# Patient Record
Sex: FEMALE | Race: WHITE | Employment: OTHER | URBAN - METROPOLITAN AREA
[De-identification: names, ages, dates, MRNs, and addresses within clinical notes are randomized per-mention and may not be internally consistent; named-entity substitution may affect disease eponyms.]

---

## 2017-06-29 ENCOUNTER — HOSPITAL ENCOUNTER (OUTPATIENT)
Dept: CT IMAGING | Age: 72
Discharge: HOME | End: 2017-06-29
Attending: INTERNAL MEDICINE
Payer: SELF-PAY

## 2017-06-29 DIAGNOSIS — Z00.00 PREVENTATIVE HEALTH CARE: ICD-10-CM

## 2017-06-29 PROCEDURE — 75571 CT HRT W/O DYE W/CA TEST: CPT

## 2017-06-30 ENCOUNTER — TELEPHONE (OUTPATIENT)
Dept: CARDIOLOGY CLINIC | Age: 72
End: 2017-06-30

## 2017-06-30 NOTE — TELEPHONE ENCOUNTER
----- Message from Eden Hyatt MD sent at 6/30/2017  7:58 AM EDT -----  Inform her she has mild plaque in CAD based upon cardiac CT. Have her f/u with me in office if she has questions. Called pt,verified pt with two pt identifiers, told pt that her CT scan is showing mild plaque with CAD and she can f/u in office to discuss with . Gave pt our office number and gave her  's name. Advised to call back and schedule an appt to discuss test results further. She verbalized that she understood everything.

## 2021-08-08 ENCOUNTER — APPOINTMENT (OUTPATIENT)
Dept: GENERAL RADIOLOGY | Age: 76
End: 2021-08-08
Attending: EMERGENCY MEDICINE
Payer: MEDICARE

## 2021-08-08 ENCOUNTER — HOSPITAL ENCOUNTER (EMERGENCY)
Dept: GENERAL RADIOLOGY | Age: 76
Discharge: HOME | End: 2021-08-08
Attending: EMERGENCY MEDICINE
Payer: MEDICARE

## 2021-08-08 ENCOUNTER — HOSPITAL ENCOUNTER (EMERGENCY)
Age: 76
Discharge: HOME | End: 2021-08-08
Attending: EMERGENCY MEDICINE
Payer: MEDICARE

## 2021-08-08 VITALS
HEIGHT: 67 IN | SYSTOLIC BLOOD PRESSURE: 153 MMHG | OXYGEN SATURATION: 88 % | BODY MASS INDEX: 25.11 KG/M2 | WEIGHT: 160 LBS | DIASTOLIC BLOOD PRESSURE: 68 MMHG | RESPIRATION RATE: 16 BRPM | HEART RATE: 74 BPM | TEMPERATURE: 96.4 F

## 2021-08-08 DIAGNOSIS — S82.401A FRACTURE OF TIBIA AND FIBULA, RIGHT, CLOSED, INITIAL ENCOUNTER: ICD-10-CM

## 2021-08-08 DIAGNOSIS — S52.91XA RADIUS/ULNA FRACTURE, RIGHT, CLOSED, INITIAL ENCOUNTER: ICD-10-CM

## 2021-08-08 DIAGNOSIS — S52.201A RADIUS/ULNA FRACTURE, RIGHT, CLOSED, INITIAL ENCOUNTER: ICD-10-CM

## 2021-08-08 DIAGNOSIS — S82.201A FRACTURE OF TIBIA AND FIBULA, RIGHT, CLOSED, INITIAL ENCOUNTER: ICD-10-CM
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
## 2021-08-08 PROCEDURE — 74011250637 HC RX REV CODE- 250/637: Performed by: EMERGENCY MEDICINE

## 2021-08-08 PROCEDURE — 74011000250 HC RX REV CODE- 250: Performed by: EMERGENCY MEDICINE

## 2021-08-08 PROCEDURE — 73100 X-RAY EXAM OF WRIST: CPT

## 2021-08-08 PROCEDURE — 99284 EMERGENCY DEPT VISIT MOD MDM: CPT

## 2021-08-08 PROCEDURE — 73110 X-RAY EXAM OF WRIST: CPT

## 2021-08-08 RX ORDER — LEVOTHYROXINE SODIUM 88 UG/1
88 TABLET ORAL
Status: CANCELLED | OUTPATIENT
Start: 2021-08-09

## 2021-08-08 RX ORDER — LIOTHYRONINE SODIUM 5 UG/1
15 TABLET ORAL DAILY
COMMUNITY

## 2021-08-08 RX ORDER — LIDOCAINE HYDROCHLORIDE 10 MG/ML
5 INJECTION, SOLUTION EPIDURAL; INFILTRATION; INTRACAUDAL; PERINEURAL ONCE
Status: COMPLETED | OUTPATIENT
Start: 2021-08-08 | End: 2021-08-08

## 2021-08-08 RX ORDER — LIOTHYRONINE SODIUM 5 UG/1
15 TABLET ORAL DAILY
Status: CANCELLED | OUTPATIENT
Start: 2021-08-09

## 2021-08-08 RX ORDER — OXYCODONE AND ACETAMINOPHEN 5; 325 MG/1; MG/1
1 TABLET ORAL
Status: COMPLETED | OUTPATIENT
Start: 2021-08-08 | End: 2021-08-08

## 2021-08-08 RX ORDER — LEVOTHYROXINE SODIUM 88 UG/1
88 TABLET ORAL
COMMUNITY

## 2021-08-08 RX ORDER — TRAMADOL HYDROCHLORIDE 50 MG/1
50 TABLET ORAL
Qty: 15 TABLET | Refills: 0 | Status: SHIPPED | OUTPATIENT
Start: 2021-08-08 | End: 2021-08-13

## 2021-08-08 RX ORDER — BUPIVACAINE HYDROCHLORIDE 5 MG/ML
5 INJECTION, SOLUTION EPIDURAL; INTRACAUDAL ONCE
Status: COMPLETED | OUTPATIENT
Start: 2021-08-08 | End: 2021-08-08

## 2021-08-08 RX ADMIN — LIDOCAINE HYDROCHLORIDE 5 ML: 10 INJECTION, SOLUTION EPIDURAL; INFILTRATION; INTRACAUDAL; PERINEURAL at 12:03

## 2021-08-08 RX ADMIN — OXYCODONE HYDROCHLORIDE AND ACETAMINOPHEN 1 TABLET: 5; 325 TABLET ORAL at 11:01

## 2021-08-08 RX ADMIN — BUPIVACAINE HYDROCHLORIDE 25 MG: 5 INJECTION, SOLUTION EPIDURAL; INTRACAUDAL; PERINEURAL at 12:03

## 2021-08-08 NOTE — DISCHARGE INSTRUCTIONS
Thank you for allowing us to provide you with medical care today. We realize that you have many choices for your emergency care needs. We thank you for choosing [facility]. Please choose us in the future for any continued health care needs. We hope we addressed all of your medical concerns. We strive to provide excellent quality care in the Emergency Department. Anything less than excellent does not meet our expectations. The exam and treatment you received in the Emergency Department were for an emergent problem and are not intended as complete care. It is important that you follow up with a doctor, nurse practitioner, or [address] [place] [address] assistant for ongoing care. If your symptoms worsen or you do not improve as expected and you are unable to reach your usual health care provider, you should return to the Emergency Department. We are available 24 hours a day. Take this sheet with you when you go to your follow-up visit. If you have any problem arranging the follow-up visit, contact the Emergency Department immediately. Make an appointment your family doctor for follow up of this visit. Return to the ER if you are unable to be seen in a timely manner.

## 2021-08-08 NOTE — ED TRIAGE NOTES
Pt arrives via EMS they report GLF with right wrist injury. Pt states that she was on the patio around the pool swishing water into the filter when she hit an uneven area on the patio with her foot causing her to fall. She reached out with her right arm to break the fall and injured the right wrist.  She has swelling and obvious deformity to the wrist.  strong radial pulse with good sensation to the fingers.   Dr Kyle Morales at the bedside

## 2021-08-08 NOTE — ED PROVIDER NOTES
Please note that this dictation was completed with [employer], the computer voice recognition software.  Quite often unanticipated grammatical, syntax, homophones, and other interpretive errors are inadvertently transcribed by the computer software.  Please disregard these errors.  Please excuse any errors that have escaped final proofreading. 77-year-old female 'L handed writing/ R handedeverything else' and past medical history markable for hypertension hypothyroidism presents the ER by EMS status post ground-level fall complaining of \"R  wrist pain deformity after I slipped and fell ([address]) this am.\"  Patient states did not hit her head did not lose consciousness is not on any blood thinners. Felt fine otherwise, just can clean up the pool, just cannot believe I did this to myself. \"    See's Dr David Meyer    pt denies HA, vison changes, diff swallowing, CP, SOB, Abd pain, F/Ch, N/V, D/Cons or other current systemic complaints           Past Medical History:   Diagnosis Date    HTN (hypertension)     Hypothyroid        Past Surgical History:   Procedure Laterality Date    HX BUNIONECTOMY      HX TONSILLECTOMY      HX TUBAL LIGATION           Family History:   Problem Relation Age of Onset    MS Mother 28    Pancreatic Cancer Father     Coronary Artery Disease Father 68       Social History     Socioeconomic History    Marital status:      Spouse name: Not on file    Number of children: Not on file    Years of education: Not on file    Highest education level: Not on file   Occupational History    Not on file   Tobacco Use    Smoking status: Former Smoker     Packs/day: 1.00     Years: 10.00     Pack years: 10.00    Smokeless tobacco: Never Used   Substance and Sexual Activity    Alcohol use: Not on file    Drug use: Not on file    Sexual activity: Not on file   Other Topics Concern    Not on file   Social History Narrative    Not on file     Social Determinants of Health     Financial Resource Strain:     Difficulty of Paying Living Expenses:    Food Insecurity:     Worried About Running Out of Food in the Last Year:     [address] in the Last Year:    Transportation Needs:     Lack of Transportation (Medical):  Lack of Transportation (Non-Medical):    Physical Activity:     Days of Exercise per Week:     Minutes of Exercise per Session:    Stress:     Feeling of Stress :    Social Connections:     Frequency of Communication with Friends and Family:     Frequency of Social Gatherings with Friends and Family:     Attends [employer] Services:     Active Member of Clubs or Organizations:     Attends Club or Organization Meetings:     Marital Status:    Intimate Partner Violence:     Fear of Current or Ex-Partner:     Emotionally Abused:     Physically Abused:     Sexually Abused: ALLERGIES: Codeine    Review of Systems   HENT: Negative for drooling and voice change. Eyes: Negative for redness. Respiratory: Negative for shortness of breath. Gastrointestinal: Negative for vomiting. Musculoskeletal: Positive for arthralgias, joint swelling and myalgias. Skin: Negative for color change, rash and wound. Neurological: Negative for facial asymmetry and speech difficulty. Psychiatric/Behavioral: Negative for confusion. All other systems reviewed and are negative. Vitals:    08/08/21 1020 08/08/21 1026 08/08/21 1230   BP: (!) 164/91 (!) 164/91 (!) 153/68   Pulse:  74    Resp:  16    Temp:  (!) 96.4 °F (35.8 °C)    SpO2: 99% 100% (!) 88%   Weight:  72.6 kg (160 lb)    Height:  5' 7\" (1.702 m)             Physical Exam  Vitals and nursing note reviewed. Constitutional:       General: She is not in acute distress. Appearance: Normal appearance. She is well-developed. She is not ill-appearing, toxic-appearing or diaphoretic.       Comments: Uncomfortable appearing, AxOx4, speaking in complete sentences    R handed   HENT:      Head: Normocephalic and atraumatic. Right Ear: External ear normal.      Left Ear: External ear normal.   Eyes:      General: No scleral icterus. Right eye: No discharge. Left eye: No discharge. Extraocular Movements: Extraocular movements intact. Conjunctiva/sclera: Conjunctivae normal.      Pupils: Pupils are equal, round, and reactive to light. Neck:      Vascular: No JVD. Trachea: No tracheal deviation. Cardiovascular:      Rate and Rhythm: Normal rate and regular rhythm. Heart sounds: Normal heart sounds. No murmur heard. No friction rub. No gallop. Pulmonary:      Effort: Pulmonary effort is normal. No respiratory distress. Breath sounds: Normal breath sounds. No wheezing or rales. Chest:      Chest wall: No tenderness. Abdominal:      General: Bowel sounds are normal.      Palpations: Abdomen is soft. Tenderness: There is no abdominal tenderness. There is no guarding or rebound. Genitourinary:     Vagina: No vaginal discharge. Musculoskeletal:         General: Swelling, tenderness, deformity and signs of injury present. Normal range of motion. Cervical back: Normal range of motion and neck supple. Comments: R wrist - noted [id number]; decreased ROM 2ary to pain; pt has distal motor/ CV/ Sensation grossly intact all  5 R fingers   Skin:     General: Skin is warm and dry. Coloration: Skin is not pale. Findings: No erythema or rash. Neurological:      Mental Status: She is alert and oriented to person, place, and time. Cranial Nerves: No cranial nerve deficit. Motor: No abnormal muscle tone. Coordination: Coordination normal.   Psychiatric:         Behavior: Behavior normal.         Thought Content: Thought content normal.          MDM       Procedures    11:00 AM  'doiing ok';  Pt tolf of fractures/ agrees w/ OVA consultation;     [address] [place] [address]   11:01 AM    ED Room Number: ECK81/75  Patient Name and age:  Reuben Gold Tanner Latham 78 y.o.  female  Working Diagnosis:   1. Fracture of tibia and fibula, right, closed, initial encounter    2. Fall, initial encounter    3. Radius/ulna fracture, right, closed, initial encounter        COVID-19 Suspicion:  no  Sepsis present:  no  Reassessment needed: yes  Code Status:  Full Code  Readmission: no  Isolation Requirements:  no  Recommended Level of Care:  med/surg  Department:[facility] - [phone]  Other:  [facility]/ \"writes w/ L hand/ everything else Im R handed; Dr Dejuan Mcintyre pt'   Dr Nata Crisostomo -      12:04 PM  MACARENA Mccrary bedside;         Arshaheengénesis Oumar Reynoso's  results have been reviewed with her. She has been counseled regarding her diagnosis. She verbally conveys understanding and agreement of the signs, symptoms, diagnosis, treatment and prognosis and additionally agrees to Call/ Arrange follow up as recommended with Dr. Livia Cramer MD in 24 - 48 hours. She also agrees with the care-plan and conveys that all of her questions have been answered. I have also put together some discharge instructions for her that include: 1) educational information regarding their diagnosis, 2) how to care for their diagnosis at home, as well a 3) list of reasons why they would want to return to the ED prior to their follow-up appointment, should their condition change or for concerns.

## 2021-08-08 NOTE — CONSULTS
ORTHOPAEDIC FRACTURE CONSULT NOTE    Subjective:     Date of Consultation:  August 8, 2021      Paramjit Muñzo is a 78 y.o. female who is being seen for a right wrist injury. Injury occurred this morning while sweeping her deck and tripped on a uneven stone. Pt landed with her left hand outstretched. Pt does write with her left hand, but, does everything else with her right hand. Pt denies any other injury or pain other than wrist. Radiographs reveal right wrist comminuted, displaced overlapping distal radius and displaced ulnar fracture. Pt denies loss of sensation. Patient Active Problem List    Diagnosis Date Noted    Hypothyroid     HTN (hypertension)      Family History   Problem Relation Age of Onset    MS Mother 28    Pancreatic Cancer Father     Coronary Artery Disease Father 68      Social History     Tobacco Use    Smoking status: Former Smoker     Packs/day: 1.00     Years: 10.00     Pack years: 10.00    Smokeless tobacco: Never Used   Substance Use Topics    Alcohol use: Never     Past Medical History:   Diagnosis Date    HTN (hypertension)     Hypothyroid       Past Surgical History:   Procedure Laterality Date    HX BUNIONECTOMY      HX FEMUR FRACTURE TX      left krysten    HX TONSILLECTOMY      HX TUBAL LIGATION        Prior to Admission medications    Medication Sig Start Date End Date Taking? Authorizing Provider   levothyroxine (SYNTHROID) 88 mcg tablet Take 88 mcg by mouth Daily (before breakfast). Yes Other, MD Salinas   liothyronine (CYTOMEL) 5 mcg tablet Take 15 mcg by mouth daily. Yes Other, MD Salinas   traMADoL (ULTRAM) 50 mg tablet Take 1 Tablet by mouth every eight (8) hours as needed for Pain for up to 5 days. Max Daily Amount: 150 mg. 8/8/21 8/13/21 Yes Alyssa Bojorquez MD   OTHER Compounded t4/t3 85/15    Provider, Historical     No current facility-administered medications for this encounter.      Current Outpatient Medications   Medication Sig    levothyroxine (SYNTHROID) 88 mcg tablet Take 88 mcg by mouth Daily (before breakfast).  liothyronine (CYTOMEL) 5 mcg tablet Take 15 mcg by mouth daily.  traMADoL (ULTRAM) 50 mg tablet Take 1 Tablet by mouth every eight (8) hours as needed for Pain for up to 5 days. Max Daily Amount: 150 mg.    OTHER Compounded t4/t3 85/15      Allergies   Allergen Reactions    Codeine Nausea and Vomiting        Review of Systems:  A comprehensive review of systems was negative except for that written in the HPI. Objective:     Patient Vitals for the past 8 hrs:   BP Temp Pulse Resp SpO2 Height Weight   21 1230 (!) 153/68    (!) 88 %     21 1026 (!) 164/91 (!) 96.4 °F (35.8 °C) 74 16 100 % 5' 7\" (1.702 m) 72.6 kg (160 lb)   21 1020 (!) 164/91    99 %       Temp (24hrs), Av.4 °F (35.8 °C), Min:96.4 °F (35.8 °C), Max:96.4 °F (35.8 °C)      Gen: Well-developed,  in no acute distress   Musc: Righ wrist with significant radial deformity. Moderate swelling, positive thumbs up, , finger extension, finger spread okay. Small skin abrasion (not open) on lateral aspect of wrist dorsal aspect on ulna side. Radial pulse +2. Full sensation throughout. Cap refill less than 3 seconds. Able to AROM all digits. Skin: No skin breakdown noted. Skin warm, pink, dry  Psych: Good insight, oriented to person, place and time, alert    Imaging Review: IMPRESSION Right wrist  1. Comminuted, displaced, and overlapped distal radius intra-articular fracture. 2. Displaced distal ulnar fracture. Labs: No results found for this or any previous visit (from the past 24 hour(s)). Impression:     Patient Active Problem List    Diagnosis Date Noted    Hypothyroid     HTN (hypertension)      Active Problems:    * No active hospital problems.  *        Plan:   DIscussed nature of condition and treatment options with patient   Consents for closed reduction of wrist fracture:  Procedure: Preformed hematoma block into fracture site with Marcaine 0.5% 10 cc and Lidocaine 2% 10 cc. Effective local anesthetic obtained. Proceeded with reduction using finger traps, 10 lb weight, manipulation with traction and countertraction for reduction. Placed well padded, well molded sugartong splint for immobilization. Patient tolerated well. Pt. Was neurovascularly intact after the reduction with full sensation. Post reduction films reveal improved alignment with continued mild radial displacement. Discussed with patient that she will need surgery and to follow up with a hand surgeon at [facility] . Pt would like to follow up with a hand surgeon at [facility] (Dr. Braeden Shetty)  Pain meds per ED team.      Dr. Claudette Carlisle aware and agree with above plan.       Daniela Clifton NP

## 2021-08-26 PROBLEM — S52.91XA RADIUS/ULNA FRACTURE, RIGHT, CLOSED, INITIAL ENCOUNTER: Status: ACTIVE | Noted: 2021-08-26

## 2021-08-26 PROBLEM — S52.201A RADIUS/ULNA FRACTURE, RIGHT, CLOSED, INITIAL ENCOUNTER: Status: ACTIVE | Noted: 2021-08-26

## 2022-03-19 PROBLEM — S52.201A RADIUS/ULNA FRACTURE, RIGHT, CLOSED, INITIAL ENCOUNTER: Status: ACTIVE | Noted: 2021-08-26

## 2022-03-19 PROBLEM — S52.91XA RADIUS/ULNA FRACTURE, RIGHT, CLOSED, INITIAL ENCOUNTER: Status: ACTIVE | Noted: 2021-08-26

## 2024-12-10 ENCOUNTER — TELEPHONE (OUTPATIENT)
Age: 79
End: 2024-12-10

## 2024-12-10 ENCOUNTER — OFFICE VISIT (OUTPATIENT)
Age: 79
End: 2024-12-10
Payer: MEDICARE

## 2024-12-10 ENCOUNTER — ANCILLARY ORDERS (OUTPATIENT)
Age: 79
End: 2024-12-10

## 2024-12-10 VITALS
HEART RATE: 76 BPM | WEIGHT: 167 LBS | SYSTOLIC BLOOD PRESSURE: 150 MMHG | HEIGHT: 67 IN | DIASTOLIC BLOOD PRESSURE: 70 MMHG | BODY MASS INDEX: 26.21 KG/M2 | OXYGEN SATURATION: 99 %

## 2024-12-10 DIAGNOSIS — I10 HYPERTENSION, UNSPECIFIED TYPE: ICD-10-CM

## 2024-12-10 DIAGNOSIS — I48.0 PAROXYSMAL ATRIAL FIBRILLATION (HCC): ICD-10-CM

## 2024-12-10 DIAGNOSIS — R00.2 PALPITATION: Primary | ICD-10-CM

## 2024-12-10 PROCEDURE — 3078F DIAST BP <80 MM HG: CPT | Performed by: INTERNAL MEDICINE

## 2024-12-10 PROCEDURE — 93005 ELECTROCARDIOGRAM TRACING: CPT | Performed by: INTERNAL MEDICINE

## 2024-12-10 PROCEDURE — G8428 CUR MEDS NOT DOCUMENT: HCPCS | Performed by: INTERNAL MEDICINE

## 2024-12-10 PROCEDURE — 3077F SYST BP >= 140 MM HG: CPT | Performed by: INTERNAL MEDICINE

## 2024-12-10 PROCEDURE — 99204 OFFICE O/P NEW MOD 45 MIN: CPT | Performed by: INTERNAL MEDICINE

## 2024-12-10 PROCEDURE — G8419 CALC BMI OUT NRM PARAM NOF/U: HCPCS | Performed by: INTERNAL MEDICINE

## 2024-12-10 PROCEDURE — 1036F TOBACCO NON-USER: CPT | Performed by: INTERNAL MEDICINE

## 2024-12-10 PROCEDURE — 1123F ACP DISCUSS/DSCN MKR DOCD: CPT | Performed by: INTERNAL MEDICINE

## 2024-12-10 PROCEDURE — G8400 PT W/DXA NO RESULTS DOC: HCPCS | Performed by: INTERNAL MEDICINE

## 2024-12-10 PROCEDURE — G8484 FLU IMMUNIZE NO ADMIN: HCPCS | Performed by: INTERNAL MEDICINE

## 2024-12-10 PROCEDURE — 1090F PRES/ABSN URINE INCON ASSESS: CPT | Performed by: INTERNAL MEDICINE

## 2024-12-10 PROCEDURE — 93010 ELECTROCARDIOGRAM REPORT: CPT | Performed by: INTERNAL MEDICINE

## 2024-12-10 RX ORDER — LEVOTHYROXINE SODIUM 75 UG/1
75 TABLET ORAL DAILY
COMMUNITY

## 2024-12-10 RX ORDER — LIOTHYRONINE SODIUM 5 UG/1
10 TABLET ORAL DAILY
COMMUNITY

## 2024-12-10 NOTE — TELEPHONE ENCOUNTER
Enrolled with [employer] - Ordered and being shipped to patient's home address on file.  ETA within 5-7 business days.        holter  Received: Today  Katalina Vang, Marisela Barclay; Dimple Etienne  14 day holter for palps per Dr. Lr- TY

## 2024-12-10 NOTE — PROGRESS NOTES
Chief Complaint   Patient presents with    Other     RACING HEART RATE. HX HEART DISEASE     Vitals:    12/10/24 1013 12/10/24 1023   BP: (!) 150/70 (!) 150/70   Site: Left Upper Arm    Position: Sitting    Cuff Size: Medium Adult    Pulse: 76    SpO2: 99%    Weight: 75.8 kg (167 lb)    Height: 1.702 m (5' 7\")       BP (!) 150/70   Pulse 76   Ht 1.702 m (5' 7\")   Wt 75.8 kg (167 lb)   SpO2 99%   BMI 26.16 kg/m²

## 2024-12-10 NOTE — PROGRESS NOTES
Patient: Fanta Covarrubias  : 1945    Primary Cardiologist:Dr. HAROLDO Lr  EP Cardiologist:NONE   PCP: Ramy Henderson MD    Today's Date: 12/10/2024      ASSESSMENT AND PLAN:     Assessment and Plan:  Hyperthyroidism  Endocrinology 50 years of history  Dr. Ramy Henderson    2.  Osteoporsosis    3.  Palpitations  13 day holter screen for PAF  Echo    4.  CV risk  CCS 18, low ish.      Follow up with Dr. HAROLDO Lr in after above testing.       ICD-10-CM    1. Palpitation  R00.2 Echo (TTE) complete (PRN contrast/bubble/strain/3D)      2. Hypertension, unspecified type  I10 EKG 12 Lead     Echo (TTE) complete (PRN contrast/bubble/strain/3D)          HISTORY OF PRESENT ILLNESS:     History of Present Illness:  Fanta Covarrubias is a 79 y.o. female here to establish of heart disease.    3 episodes - elevated HR - went to ED. A couple years ago, was on higher dose of thyroid replacement.        Was on statin, stopped.      Sister  three years ago of CVA.    Father CABG x 3.  Uncles massive heart attacks.  Dad  of cancer.      Prior tobacco history - girl  camp, college, 20s [facility] [place].  Quit.   a smoker, smoked in her 30s.   Quit, then back - off and on.      [facility] - 2017 - 18, low.       has dementia.    Denies chest pain, edema, syncope, shortness of breath at rest, dyspnea on exertion, PND or orthopnea.    PAST MEDICAL HISTORY:     Past Medical History:   Diagnosis Date    HTN (hypertension)     Hypothyroid         Past Surgical History:   Procedure Laterality Date    BUNIONECTOMY      FEMUR FRACTURE SURGERY      left chely    TONSILLECTOMY      TUBAL LIGATION         CURRENT MEDICATIONS:    .  Current Outpatient Medications   Medication Sig Dispense Refill    levothyroxine (SYNTHROID) 75 MCG tablet Take 1 tablet by mouth Daily      liothyronine (CYTOMEL) 5 MCG tablet Take 2 tablets by mouth daily       No current facility-administered medications for this visit.

## 2024-12-17 ENCOUNTER — TELEPHONE (OUTPATIENT)
Age: 79
End: 2024-12-17

## 2024-12-17 ENCOUNTER — CLINICAL DOCUMENTATION (OUTPATIENT)
Age: 79
End: 2024-12-17

## 2024-12-17 NOTE — TELEPHONE ENCOUNTER
Patient called since her monitor was beeping she is curious is everything okay or should she be doing anything differently when you get a opportunity please assist    P#[phone]

## 2025-01-03 ENCOUNTER — TELEPHONE (OUTPATIENT)
Age: 80
End: 2025-01-03

## 2025-01-03 NOTE — TELEPHONE ENCOUNTER
Spoke with patient after ID x2 and conveyed results msg below.  Patient expressed understanding.  Opportunities were given for questions and concerns, patient had not additional questions.      ----- Message from Dr. Precious Lr MD sent at 1/2/2025  5:36 PM EST -----  Hi Fanta,    Your Holter monitor interestingly showed atrial fibrillation, an episode 2 hours and 37 minutes on day 5.    A-fib is a common arrhythmia in patients over 65, and carries a risk of stroke.  For that reason we typically put patients on blood thinners such as Eliquis to prevent the risk of stroke.    I am going to send a prescription for Eliquis and asked Katalina to get in touch with you-I am happy to set up an earlier appointment to discuss.    Dr. Lr

## 2025-01-21 ENCOUNTER — ANCILLARY PROCEDURE (OUTPATIENT)
Age: 80
End: 2025-01-21
Payer: MEDICARE

## 2025-01-21 VITALS
DIASTOLIC BLOOD PRESSURE: 74 MMHG | HEIGHT: 67 IN | HEART RATE: 67 BPM | WEIGHT: 167 LBS | BODY MASS INDEX: 26.21 KG/M2 | SYSTOLIC BLOOD PRESSURE: 120 MMHG

## 2025-01-21 DIAGNOSIS — I10 HYPERTENSION, UNSPECIFIED TYPE: ICD-10-CM

## 2025-01-21 DIAGNOSIS — R00.2 PALPITATION: ICD-10-CM

## 2025-01-21 PROCEDURE — 93306 TTE W/DOPPLER COMPLETE: CPT | Performed by: INTERNAL MEDICINE

## 2025-01-22 LAB
ECHO AO ASC DIAM: 3.1 CM
ECHO AO ASCENDING AORTA INDEX: 1.66 CM/M2
ECHO AO ROOT DIAM: 2.7 CM
ECHO AO ROOT INDEX: 1.44 CM/M2
ECHO AV AREA PEAK VELOCITY: 2.3 CM2
ECHO AV AREA VTI: 2.3 CM2
ECHO AV AREA/BSA PEAK VELOCITY: 1.2 CM2/M2
ECHO AV AREA/BSA VTI: 1.2 CM2/M2
ECHO AV MEAN GRADIENT: 5 MMHG
ECHO AV MEAN VELOCITY: 1 M/S
ECHO AV PEAK GRADIENT: 9 MMHG
ECHO AV PEAK VELOCITY: 1.5 M/S
ECHO AV VELOCITY RATIO: 0.67
ECHO AV VTI: 31.4 CM
ECHO BSA: 1.89 M2
ECHO EST RA PRESSURE: 3 MMHG
ECHO LA DIAMETER INDEX: 2.46 CM/M2
ECHO LA DIAMETER: 4.6 CM
ECHO LA TO AORTIC ROOT RATIO: 1.7
ECHO LA VOL A-L A2C: 66 ML (ref 22–52)
ECHO LA VOL A-L A4C: 80 ML (ref 22–52)
ECHO LA VOL MOD A2C: 63 ML (ref 22–52)
ECHO LA VOL MOD A4C: 75 ML (ref 22–52)
ECHO LA VOLUME AREA LENGTH: 73 ML
ECHO LA VOLUME INDEX A-L A2C: 35 ML/M2 (ref 16–34)
ECHO LA VOLUME INDEX A-L A4C: 43 ML/M2 (ref 16–34)
ECHO LA VOLUME INDEX AREA LENGTH: 39 ML/M2 (ref 16–34)
ECHO LA VOLUME INDEX MOD A2C: 34 ML/M2 (ref 16–34)
ECHO LA VOLUME INDEX MOD A4C: 40 ML/M2 (ref 16–34)
ECHO LV E' LATERAL VELOCITY: 7.89 CM/S
ECHO LV E' SEPTAL VELOCITY: 3.62 CM/S
ECHO LV EDV A2C: 80 ML
ECHO LV EDV A4C: 84 ML
ECHO LV EDV BP: 82 ML (ref 56–104)
ECHO LV EDV INDEX A4C: 45 ML/M2
ECHO LV EDV INDEX BP: 44 ML/M2
ECHO LV EDV NDEX A2C: 43 ML/M2
ECHO LV EJECTION FRACTION A2C: 66 %
ECHO LV EJECTION FRACTION A4C: 63 %
ECHO LV EJECTION FRACTION BIPLANE: 65 % (ref 55–100)
ECHO LV ESV A2C: 27 ML
ECHO LV ESV A4C: 31 ML
ECHO LV ESV BP: 29 ML (ref 19–49)
ECHO LV ESV INDEX A2C: 14 ML/M2
ECHO LV ESV INDEX A4C: 17 ML/M2
ECHO LV ESV INDEX BP: 16 ML/M2
ECHO LV FRACTIONAL SHORTENING: 39 % (ref 28–44)
ECHO LV INTERNAL DIMENSION DIASTOLE INDEX: 2.46 CM/M2
ECHO LV INTERNAL DIMENSION DIASTOLIC: 4.6 CM (ref 3.9–5.3)
ECHO LV INTERNAL DIMENSION SYSTOLIC INDEX: 1.5 CM/M2
ECHO LV INTERNAL DIMENSION SYSTOLIC: 2.8 CM
ECHO LV IVSD: 0.7 CM (ref 0.6–0.9)
ECHO LV MASS 2D: 108.5 G (ref 67–162)
ECHO LV MASS INDEX 2D: 58 G/M2 (ref 43–95)
ECHO LV POSTERIOR WALL DIASTOLIC: 0.8 CM (ref 0.6–0.9)
ECHO LV RELATIVE WALL THICKNESS RATIO: 0.35
ECHO LVOT AREA: 3.5 CM2
ECHO LVOT AV VTI INDEX: 0.67
ECHO LVOT DIAM: 2.1 CM
ECHO LVOT MEAN GRADIENT: 2 MMHG
ECHO LVOT PEAK GRADIENT: 4 MMHG
ECHO LVOT PEAK VELOCITY: 1 M/S
ECHO LVOT STROKE VOLUME INDEX: 39.1 ML/M2
ECHO LVOT SV: 73 ML
ECHO LVOT VTI: 21.1 CM
ECHO MV A VELOCITY: 0.72 M/S
ECHO MV AREA PHT: 3.3 CM2
ECHO MV AREA VTI: 3.3 CM2
ECHO MV E DECELERATION TIME (DT): 229.8 MS
ECHO MV E VELOCITY: 0.55 M/S
ECHO MV E/A RATIO: 0.76
ECHO MV E/E' LATERAL: 6.97
ECHO MV E/E' RATIO (AVERAGED): 11.08
ECHO MV E/E' SEPTAL: 15.19
ECHO MV LVOT VTI INDEX: 1.04
ECHO MV MAX VELOCITY: 0.9 M/S
ECHO MV MEAN GRADIENT: 1 MMHG
ECHO MV MEAN VELOCITY: 0.5 M/S
ECHO MV PEAK GRADIENT: 3 MMHG
ECHO MV PRESSURE HALF TIME (PHT): 66.7 MS
ECHO MV VTI: 22 CM
ECHO RA AREA 4C: 19.2 CM2
ECHO RA END SYSTOLIC VOLUME APICAL 4 CHAMBER INDEX BSA: 30 ML/M2
ECHO RA VOLUME: 56 ML
ECHO RIGHT VENTRICULAR SYSTOLIC PRESSURE (RVSP): 28 MMHG
ECHO RV INTERNAL DIMENSION: 4.7 CM
ECHO RV TAPSE: 2.1 CM (ref 1.7–?)
ECHO TV REGURGITANT MAX VELOCITY: 2.51 M/S
ECHO TV REGURGITANT PEAK GRADIENT: 25 MMHG

## 2025-03-06 ENCOUNTER — TELEPHONE (OUTPATIENT)
Age: 80
End: 2025-03-06

## 2025-03-06 NOTE — TELEPHONE ENCOUNTER
Patient calling in with concerns she had taken both of her Eliquis doses for today this morning on accident.     Spoke with SIENNA Yao and patient does not need her evening dose tonight and can resume normal dosing tomorrow.

## 2025-04-14 ENCOUNTER — OFFICE VISIT (OUTPATIENT)
Age: 80
End: 2025-04-14
Payer: MEDICARE

## 2025-04-14 VITALS
SYSTOLIC BLOOD PRESSURE: 138 MMHG | OXYGEN SATURATION: 97 % | WEIGHT: 164 LBS | DIASTOLIC BLOOD PRESSURE: 70 MMHG | BODY MASS INDEX: 25.74 KG/M2 | HEIGHT: 67 IN | HEART RATE: 64 BPM

## 2025-04-14 DIAGNOSIS — I48.0 PAROXYSMAL ATRIAL FIBRILLATION (HCC): ICD-10-CM

## 2025-04-14 DIAGNOSIS — R00.2 PALPITATION: Primary | ICD-10-CM

## 2025-04-14 DIAGNOSIS — E03.9 HYPOTHYROIDISM, UNSPECIFIED TYPE: ICD-10-CM

## 2025-04-14 PROCEDURE — 99214 OFFICE O/P EST MOD 30 MIN: CPT | Performed by: INTERNAL MEDICINE

## 2025-04-14 PROCEDURE — 1090F PRES/ABSN URINE INCON ASSESS: CPT | Performed by: INTERNAL MEDICINE

## 2025-04-14 PROCEDURE — 1123F ACP DISCUSS/DSCN MKR DOCD: CPT | Performed by: INTERNAL MEDICINE

## 2025-04-14 PROCEDURE — G8419 CALC BMI OUT NRM PARAM NOF/U: HCPCS | Performed by: INTERNAL MEDICINE

## 2025-04-14 PROCEDURE — G8428 CUR MEDS NOT DOCUMENT: HCPCS | Performed by: INTERNAL MEDICINE

## 2025-04-14 PROCEDURE — 3075F SYST BP GE 130 - 139MM HG: CPT | Performed by: INTERNAL MEDICINE

## 2025-04-14 PROCEDURE — 1036F TOBACCO NON-USER: CPT | Performed by: INTERNAL MEDICINE

## 2025-04-14 PROCEDURE — 3078F DIAST BP <80 MM HG: CPT | Performed by: INTERNAL MEDICINE

## 2025-04-14 PROCEDURE — G8400 PT W/DXA NO RESULTS DOC: HCPCS | Performed by: INTERNAL MEDICINE

## 2025-04-14 RX ORDER — METOPROLOL SUCCINATE 25 MG/1
12.5 TABLET, EXTENDED RELEASE ORAL DAILY
Qty: 45 TABLET | Refills: 3 | Status: SHIPPED | OUTPATIENT
Start: 2025-04-14

## 2025-04-14 NOTE — PROGRESS NOTES
Chief Complaint   Patient presents with    Palpitations    Hypertension     Vitals:    04/14/25 1113   BP: 138/70   BP Site: Left Upper Arm   Patient Position: Sitting   BP Cuff Size: Medium Adult   Pulse: 64   SpO2: 97%   Weight: 74.4 kg (164 lb)   Height: 1.702 m (5' 7\")      /70 (BP Site: Left Upper Arm, Patient Position: Sitting, BP Cuff Size: Medium Adult)   Pulse 64   Ht 1.702 m (5' 7\")   Wt 74.4 kg (164 lb)   SpO2 97%   BMI 25.69 kg/m²

## 2025-04-14 NOTE — PROGRESS NOTES
Patient: Fanta Covarrubias  : 1945    Primary Cardiologist:Dr. HAROLDO Lr  EP Cardiologist:NONE   PCP: Ramy Henderson MD    Today's Date: 2025      ASSESSMENT AND PLAN:     Assessment and Plan:  Hyperthyroidism  Endocrinology 50 years of history  Dr. Ramy Henderson - starting with new PCP.     2.  Osteoporsosis    3.  Palpitations  13 day holter screen for PAF  Patient monitored for 14d, analyzable time was 12d 14h starting on 2024 09:00 pm.  Primary rhythm was Sinus Rhythm. Average heart rate was 67 bpm, Minimum heart rate was 40 bpm on Day :23:20 am, Max heart rate was 177 bpm on Day 10 / 10:43:19 am  Atrial Fibrillation or Flutter: [place] was 0.86 %, longest event 2h 37min on Day :45:22 am, fastest event 144 bpm on Day :45:22 am.  SVE(s): [place] was 1.43 %, 62140 total SVE(s)  SV Arrhythmias: 160 event(s), longest event 22 beats on Day :48:23 pm, fastest event 177 bpm on Day 10/ 10:43:24 am  Pause: 1 event(s), longest pause 1793 ms on Day 10 / 09:29:48 am  PVC(s): [place] was 0.56 %, 6848 total PVC(s), 2 disparate morphologies  Ventricular Arrhythmia(s): 2 event(s), longest event 3 beats at Day :38:01 am, fastest event 110 bpm at Day :45:40 am      Echo  25    ECHO (TTE) COMPLETE (PRN CONTRAST/BUBBLE/STRAIN/3D) 2025 10:34 AM (Final)    Interpretation Summary    Left Ventricle: Normal left ventricular systolic function. EF by 2D Simpsons Biplane is 65%. Left ventricle size is normal. Normal wall thickness. Normal wall motion. Sigmoid septum    Left Atrium: Left atrium is dilated. Left atrial volume index is mildly increased (35-41 mL/m2).    Mitral Valve: Mild to moderate regurgitation.    Tricuspid Valve: Mild to moderate regurgitation. The estimated RVSP is 28 mmHg.    Image quality is adequate.    Signed by: Precious Lr MD on 2025 10:34 AM    Continue Eliquis, add low dose BB Toprol 12.5 QD    4.  CV risk  CCS 18, low

## 2025-06-24 RX ORDER — LEVOTHYROXINE SODIUM 75 UG/1
TABLET ORAL
Qty: 90 TABLET | Refills: 0 | OUTPATIENT
Start: 2025-06-24

## 2025-06-25 SDOH — HEALTH STABILITY: PHYSICAL HEALTH: ON AVERAGE, HOW MANY MINUTES DO YOU ENGAGE IN EXERCISE AT THIS LEVEL?: 0 MIN

## 2025-06-25 SDOH — HEALTH STABILITY: PHYSICAL HEALTH: ON AVERAGE, HOW MANY DAYS PER WEEK DO YOU ENGAGE IN MODERATE TO STRENUOUS EXERCISE (LIKE A BRISK WALK)?: 0 DAYS

## 2025-06-26 ENCOUNTER — OFFICE VISIT (OUTPATIENT)
Age: 80
End: 2025-06-26
Payer: MEDICARE

## 2025-06-26 VITALS
HEART RATE: 56 BPM | BODY MASS INDEX: 26.36 KG/M2 | SYSTOLIC BLOOD PRESSURE: 133 MMHG | OXYGEN SATURATION: 96 % | HEIGHT: 66 IN | RESPIRATION RATE: 16 BRPM | DIASTOLIC BLOOD PRESSURE: 73 MMHG | TEMPERATURE: 97.9 F | WEIGHT: 164 LBS

## 2025-06-26 DIAGNOSIS — E03.9 HYPOTHYROIDISM, UNSPECIFIED TYPE: ICD-10-CM

## 2025-06-26 DIAGNOSIS — Z76.89 ENCOUNTER TO ESTABLISH CARE: ICD-10-CM

## 2025-06-26 DIAGNOSIS — E03.9 HYPOTHYROIDISM, UNSPECIFIED TYPE: Primary | ICD-10-CM

## 2025-06-26 DIAGNOSIS — M81.0 AGE-RELATED OSTEOPOROSIS WITHOUT CURRENT PATHOLOGICAL FRACTURE: ICD-10-CM

## 2025-06-26 DIAGNOSIS — F33.0 MILD EPISODE OF RECURRENT MAJOR DEPRESSIVE DISORDER: ICD-10-CM

## 2025-06-26 DIAGNOSIS — I48.0 PAROXYSMAL ATRIAL FIBRILLATION (HCC): ICD-10-CM

## 2025-06-26 DIAGNOSIS — I10 PRIMARY HYPERTENSION: ICD-10-CM

## 2025-06-26 PROBLEM — F32.0 CURRENT MILD EPISODE OF MAJOR DEPRESSIVE DISORDER: Status: ACTIVE | Noted: 2025-06-26

## 2025-06-26 PROBLEM — S52.91XA RADIUS/ULNA FRACTURE, RIGHT, CLOSED, INITIAL ENCOUNTER: Status: RESOLVED | Noted: 2021-08-26 | Resolved: 2025-06-26

## 2025-06-26 PROBLEM — S52.201A RADIUS/ULNA FRACTURE, RIGHT, CLOSED, INITIAL ENCOUNTER: Status: RESOLVED | Noted: 2021-08-26 | Resolved: 2025-06-26

## 2025-06-26 PROCEDURE — G8427 DOCREV CUR MEDS BY ELIG CLIN: HCPCS | Performed by: STUDENT IN AN ORGANIZED HEALTH CARE EDUCATION/TRAINING PROGRAM

## 2025-06-26 PROCEDURE — 1123F ACP DISCUSS/DSCN MKR DOCD: CPT | Performed by: STUDENT IN AN ORGANIZED HEALTH CARE EDUCATION/TRAINING PROGRAM

## 2025-06-26 PROCEDURE — 3078F DIAST BP <80 MM HG: CPT | Performed by: STUDENT IN AN ORGANIZED HEALTH CARE EDUCATION/TRAINING PROGRAM

## 2025-06-26 PROCEDURE — G8400 PT W/DXA NO RESULTS DOC: HCPCS | Performed by: STUDENT IN AN ORGANIZED HEALTH CARE EDUCATION/TRAINING PROGRAM

## 2025-06-26 PROCEDURE — 1159F MED LIST DOCD IN RCRD: CPT | Performed by: STUDENT IN AN ORGANIZED HEALTH CARE EDUCATION/TRAINING PROGRAM

## 2025-06-26 PROCEDURE — 99214 OFFICE O/P EST MOD 30 MIN: CPT | Performed by: STUDENT IN AN ORGANIZED HEALTH CARE EDUCATION/TRAINING PROGRAM

## 2025-06-26 PROCEDURE — 1126F AMNT PAIN NOTED NONE PRSNT: CPT | Performed by: STUDENT IN AN ORGANIZED HEALTH CARE EDUCATION/TRAINING PROGRAM

## 2025-06-26 PROCEDURE — 3075F SYST BP GE 130 - 139MM HG: CPT | Performed by: STUDENT IN AN ORGANIZED HEALTH CARE EDUCATION/TRAINING PROGRAM

## 2025-06-26 PROCEDURE — G8419 CALC BMI OUT NRM PARAM NOF/U: HCPCS | Performed by: STUDENT IN AN ORGANIZED HEALTH CARE EDUCATION/TRAINING PROGRAM

## 2025-06-26 PROCEDURE — 1090F PRES/ABSN URINE INCON ASSESS: CPT | Performed by: STUDENT IN AN ORGANIZED HEALTH CARE EDUCATION/TRAINING PROGRAM

## 2025-06-26 PROCEDURE — 1036F TOBACCO NON-USER: CPT | Performed by: STUDENT IN AN ORGANIZED HEALTH CARE EDUCATION/TRAINING PROGRAM

## 2025-06-26 PROCEDURE — 1160F RVW MEDS BY RX/DR IN RCRD: CPT | Performed by: STUDENT IN AN ORGANIZED HEALTH CARE EDUCATION/TRAINING PROGRAM

## 2025-06-26 SDOH — ECONOMIC STABILITY: FOOD INSECURITY: WITHIN THE PAST 12 MONTHS, YOU WORRIED THAT YOUR FOOD WOULD RUN OUT BEFORE YOU GOT MONEY TO BUY MORE.: NEVER TRUE

## 2025-06-26 SDOH — ECONOMIC STABILITY: FOOD INSECURITY: WITHIN THE PAST 12 MONTHS, THE FOOD YOU BOUGHT JUST DIDN'T LAST AND YOU DIDN'T HAVE MONEY TO GET MORE.: NEVER TRUE

## 2025-06-26 ASSESSMENT — ENCOUNTER SYMPTOMS
BLOOD IN STOOL: 0
VOMITING: 0
NAUSEA: 0
DIARRHEA: 0
CONSTIPATION: 0
COUGH: 0
ABDOMINAL PAIN: 0
SHORTNESS OF BREATH: 0

## 2025-06-26 ASSESSMENT — PATIENT HEALTH QUESTIONNAIRE - PHQ9
2. FEELING DOWN, DEPRESSED OR HOPELESS: MORE THAN HALF THE DAYS
SUM OF ALL RESPONSES TO PHQ QUESTIONS 1-9: 2
SUM OF ALL RESPONSES TO PHQ QUESTIONS 1-9: 2
1. LITTLE INTEREST OR PLEASURE IN DOING THINGS: NOT AT ALL
SUM OF ALL RESPONSES TO PHQ QUESTIONS 1-9: 2
SUM OF ALL RESPONSES TO PHQ QUESTIONS 1-9: 2

## 2025-06-26 NOTE — ASSESSMENT & PLAN NOTE
On Wellbutrin in the past, says it made her feel numb. Wants to avoid medications for now but will re-visit if she has more difficulty coping with stressors.

## 2025-06-26 NOTE — ASSESSMENT & PLAN NOTE
Hx of two fractures previously, says that she has never been on any treatment for her osteoporosis. Sent request for records including previous DEXA scans.

## 2025-06-26 NOTE — PROGRESS NOTES
Verified name and birth date for privacy precautions.   Chart reviewed in preparation for today's visit.     Chief Complaint   Patient presents with    New Patient          Health Maintenance Due   Topic    Depression Screen     Hepatitis C screen     DTaP/Tdap/Td vaccine (1 - Tdap)    Shingles vaccine (1 of 2)    Pneumococcal 50+ years Vaccine (1 of 1 - PCV)    DEXA (modify frequency per FRAX score)     Respiratory Syncytial Virus (RSV) Pregnant or age 60 yrs+ (1 - 1-dose 75+ series)    COVID-19 Vaccine (1 - 2024-25 season)    Annual Wellness Visit (Medicare)          Wt Readings from Last 3 Encounters:   06/26/25 74.4 kg (164 lb)   04/14/25 74.4 kg (164 lb)   01/21/25 75.8 kg (167 lb)     Temp Readings from Last 3 Encounters:   06/26/25 97.9 °F (36.6 °C)     BP Readings from Last 3 Encounters:   06/26/25 133/73   04/14/25 138/70   01/21/25 120/74     Pulse Readings from Last 3 Encounters:   06/26/25 56   04/14/25 64   01/21/25 67       Social Drivers of Health     Tobacco Use: Medium Risk (6/26/2025)    Patient History     Smoking Tobacco Use: Former     Smokeless Tobacco Use: Never     Passive Exposure: Not on file   Alcohol Use: Not on file   Financial Resource Strain: Not on file   Food Insecurity: No Food Insecurity (6/26/2025)    Hunger Vital Sign     Worried About Running Out of Food in the Last Year: Never true     Ran Out of Food in the Last Year: Never true   Transportation Needs: No Transportation Needs (6/26/2025)    PRAPARE - Transportation     Lack of Transportation (Medical): No     Lack of Transportation (Non-Medical): No   Physical Activity: Inactive (6/25/2025)    Exercise Vital Sign     Days of Exercise per Week: 0 days     Minutes of Exercise per Session: 0 min   Stress: Not on file   Social Connections: Not on file   Intimate Partner Violence: Not on file   Depression: Not at risk (6/26/2025)    PHQ-2     PHQ-2 Score: 2   Housing Stability: Low Risk  (6/26/2025)    Housing Stability Vital Sign 
fibrillation (HCC)    Age-related osteoporosis without current pathological fracture    Current mild episode of major depressive disorder       Past Medical History:   Diagnosis Date    Arrhythmia     Atrial fibrillation (HCC)     HTN (hypertension)     Hyperlipidemia     Hypothyroid     Osteoarthritis     Urinary incontinence        Prior to Admission medications    Medication Sig Start Date End Date Taking? Authorizing Provider   metoprolol succinate (TOPROL XL) 25 MG extended release tablet Take 0.5 tablets by mouth daily 25  Yes Precious Lr MD   apixaban (ELIQUIS) 5 MG TABS tablet Take 1 tablet by mouth 2 times daily 25  Yes Precious Lr MD   levothyroxine (SYNTHROID) 75 MCG tablet Take 1 tablet by mouth Daily   Yes ProviderMahnaz MD   liothyronine (CYTOMEL) 5 MCG tablet Take 2 tablets by mouth daily   Yes Provider, MD Mahnaz       Surgical History    Past Surgical History:   Procedure Laterality Date    BUNIONECTOMY      COLONOSCOPY      FEMUR FRACTURE SURGERY      left chely    HIP SURGERY      JW femur chely left    TONSILLECTOMY      TUBAL LIGATION         Family History    Family History   Problem Relation Age of Onset    Pancreatic Cancer Father     Coronary Art Dis Father 73    Heart Attack Father         father    Heart Disease Father     Mult Sclerosis Mother 35    Early Death Mother         ALS 1952    Stroke Sister          age 80    Cancer Maternal Grandfather         lung    Arthritis Paternal Grandmother     Hearing Loss Paternal Grandmother     Heart Attack Paternal Uncle     Breast Cancer Paternal Aunt         late in life 2 sisters    Coronary Art Dis Paternal Uncle         3 brothers        Social History    Social History     Occupational History    Not on file   Tobacco Use    Smoking status: Former     Current packs/day: 1.00     Average packs/day: 1 pack/day for 60.5 years (60.5 ttl pk-yrs)     Types: Cigarettes     Start date: 1965    Smokeless

## 2025-06-27 LAB
ALBUMIN SERPL-MCNC: 3.9 G/DL (ref 3.5–5)
ALBUMIN/GLOB SERPL: 1.3 (ref 1.1–2.2)
ALP SERPL-CCNC: 78 U/L (ref 45–117)
ALT SERPL-CCNC: 26 U/L (ref 12–78)
ANION GAP SERPL CALC-SCNC: 7 MMOL/L (ref 2–12)
AST SERPL-CCNC: 17 U/L (ref 15–37)
BILIRUB SERPL-MCNC: 0.5 MG/DL (ref 0.2–1)
BUN SERPL-MCNC: 28 MG/DL (ref 6–20)
BUN/CREAT SERPL: 29 (ref 12–20)
CALCIUM SERPL-MCNC: 9.2 MG/DL (ref 8.5–10.1)
CHLORIDE SERPL-SCNC: 108 MMOL/L (ref 97–108)
CO2 SERPL-SCNC: 28 MMOL/L (ref 21–32)
CREAT SERPL-MCNC: 0.97 MG/DL (ref 0.55–1.02)
ERYTHROCYTE [DISTWIDTH] IN BLOOD BY AUTOMATED COUNT: 13 % (ref 11.5–14.5)
GLOBULIN SER CALC-MCNC: 2.9 G/DL (ref 2–4)
GLUCOSE SERPL-MCNC: 96 MG/DL (ref 65–100)
HCT VFR BLD AUTO: 41 % (ref 35–47)
HGB BLD-MCNC: 13.4 G/DL (ref 11.5–16)
MCH RBC QN AUTO: 31.5 PG (ref 26–34)
MCHC RBC AUTO-ENTMCNC: 32.7 G/DL (ref 30–36.5)
MCV RBC AUTO: 96.2 FL (ref 80–99)
NRBC # BLD: 0 K/UL (ref 0–0.01)
NRBC BLD-RTO: 0 PER 100 WBC
PLATELET # BLD AUTO: 268 K/UL (ref 150–400)
PMV BLD AUTO: 10 FL (ref 8.9–12.9)
POTASSIUM SERPL-SCNC: 4.3 MMOL/L (ref 3.5–5.1)
PROT SERPL-MCNC: 6.8 G/DL (ref 6.4–8.2)
RBC # BLD AUTO: 4.26 M/UL (ref 3.8–5.2)
SODIUM SERPL-SCNC: 143 MMOL/L (ref 136–145)
T4 FREE SERPL-MCNC: 1.1 NG/DL (ref 0.8–1.5)
TSH SERPL DL<=0.05 MIU/L-ACNC: 0.03 UIU/ML (ref 0.36–3.74)
WBC # BLD AUTO: 6.4 K/UL (ref 3.6–11)

## 2025-06-30 ENCOUNTER — RESULTS FOLLOW-UP (OUTPATIENT)
Age: 80
End: 2025-06-30

## 2025-07-01 ENCOUNTER — TELEPHONE (OUTPATIENT)
Age: 80
End: 2025-07-01

## 2025-07-01 LAB — T3FREE SERPL-MCNC: 2.8 PG/ML (ref 2.2–4)

## 2025-07-01 NOTE — TELEPHONE ENCOUNTER
----- Message from Diogenes LEVI sent at 6/30/2025  9:21 AM EDT -----  Regarding: [facility] Referral Request  [facility] Referral Request    Reason for referral request: Specialty Provider    Specialist/Lab/Test patient is requesting (if known):Endocrinologist    Specialist Phone Number (if applicable):[phone]    Additional Information patient wants to send over the medical record to the specialist dr. Evans Shanks, fax number is [phone].  --------------------------------------------------------------------------------------------------------------------------    Relationship to Patient: Self     Call Back Information: OK to leave message on voicemail  Preferred Call Back Number: Phone [phone]

## 2025-07-02 LAB — T3 SERPL-MCNC: 114 NG/DL (ref 71–180)

## 2025-07-08 ENCOUNTER — TELEPHONE (OUTPATIENT)
Age: 80
End: 2025-07-08